# Patient Record
Sex: FEMALE
[De-identification: names, ages, dates, MRNs, and addresses within clinical notes are randomized per-mention and may not be internally consistent; named-entity substitution may affect disease eponyms.]

---

## 2020-04-26 ENCOUNTER — MESSAGE (OUTPATIENT)
Age: 45
End: 2020-04-26

## 2023-02-03 ENCOUNTER — EMERGENCY (EMERGENCY)
Facility: HOSPITAL | Age: 48
LOS: 1 days | Discharge: HOME | End: 2023-02-03
Attending: STUDENT IN AN ORGANIZED HEALTH CARE EDUCATION/TRAINING PROGRAM
Payer: MEDICAID

## 2023-02-03 VITALS
HEIGHT: 66 IN | OXYGEN SATURATION: 98 % | RESPIRATION RATE: 20 BRPM | WEIGHT: 149.91 LBS | TEMPERATURE: 99 F | HEART RATE: 81 BPM | DIASTOLIC BLOOD PRESSURE: 76 MMHG | SYSTOLIC BLOOD PRESSURE: 115 MMHG

## 2023-02-03 LAB
ALBUMIN SERPL ELPH-MCNC: 4.5 G/DL — SIGNIFICANT CHANGE UP (ref 3.3–5)
ALP SERPL-CCNC: 89 U/L — SIGNIFICANT CHANGE UP (ref 40–120)
ALT FLD-CCNC: 5 U/L — LOW (ref 10–45)
ANION GAP SERPL CALC-SCNC: 12 MMOL/L — SIGNIFICANT CHANGE UP (ref 5–17)
APTT BLD: 34.2 SEC — SIGNIFICANT CHANGE UP (ref 27.5–35.5)
AST SERPL-CCNC: 15 U/L — SIGNIFICANT CHANGE UP (ref 10–40)
BASOPHILS # BLD AUTO: 0.05 K/UL — SIGNIFICANT CHANGE UP (ref 0–0.2)
BASOPHILS NFR BLD AUTO: 1.1 % — SIGNIFICANT CHANGE UP (ref 0–2)
BILIRUB SERPL-MCNC: 0.2 MG/DL — SIGNIFICANT CHANGE UP (ref 0.2–1.2)
BUN SERPL-MCNC: 11 MG/DL — SIGNIFICANT CHANGE UP (ref 7–23)
CALCIUM SERPL-MCNC: 9.8 MG/DL — SIGNIFICANT CHANGE UP (ref 8.4–10.5)
CHLORIDE SERPL-SCNC: 102 MMOL/L — SIGNIFICANT CHANGE UP (ref 96–108)
CO2 SERPL-SCNC: 25 MMOL/L — SIGNIFICANT CHANGE UP (ref 22–31)
CREAT SERPL-MCNC: 0.99 MG/DL — SIGNIFICANT CHANGE UP (ref 0.5–1.3)
EGFR: 72 ML/MIN/1.73M2 — SIGNIFICANT CHANGE UP
EOSINOPHIL # BLD AUTO: 0.06 K/UL — SIGNIFICANT CHANGE UP (ref 0–0.5)
EOSINOPHIL NFR BLD AUTO: 1.3 % — SIGNIFICANT CHANGE UP (ref 0–6)
GLUCOSE SERPL-MCNC: 101 MG/DL — HIGH (ref 70–99)
HCT VFR BLD CALC: 39.4 % — SIGNIFICANT CHANGE UP (ref 34.5–45)
HGB BLD-MCNC: 12 G/DL — SIGNIFICANT CHANGE UP (ref 11.5–15.5)
IMM GRANULOCYTES NFR BLD AUTO: 0.2 % — SIGNIFICANT CHANGE UP (ref 0–0.9)
INR BLD: 1.11 RATIO — SIGNIFICANT CHANGE UP (ref 0.88–1.16)
LYMPHOCYTES # BLD AUTO: 1.1 K/UL — SIGNIFICANT CHANGE UP (ref 1–3.3)
LYMPHOCYTES # BLD AUTO: 23.2 % — SIGNIFICANT CHANGE UP (ref 13–44)
MCHC RBC-ENTMCNC: 24.5 PG — LOW (ref 27–34)
MCHC RBC-ENTMCNC: 30.5 GM/DL — LOW (ref 32–36)
MCV RBC AUTO: 80.4 FL — SIGNIFICANT CHANGE UP (ref 80–100)
MONOCYTES # BLD AUTO: 0.49 K/UL — SIGNIFICANT CHANGE UP (ref 0–0.9)
MONOCYTES NFR BLD AUTO: 10.3 % — SIGNIFICANT CHANGE UP (ref 2–14)
NEUTROPHILS # BLD AUTO: 3.03 K/UL — SIGNIFICANT CHANGE UP (ref 1.8–7.4)
NEUTROPHILS NFR BLD AUTO: 63.9 % — SIGNIFICANT CHANGE UP (ref 43–77)
NRBC # BLD: 0 /100 WBCS — SIGNIFICANT CHANGE UP (ref 0–0)
PLATELET # BLD AUTO: 303 K/UL — SIGNIFICANT CHANGE UP (ref 150–400)
POTASSIUM SERPL-MCNC: 3.4 MMOL/L — LOW (ref 3.5–5.3)
POTASSIUM SERPL-SCNC: 3.4 MMOL/L — LOW (ref 3.5–5.3)
PROT SERPL-MCNC: 8 G/DL — SIGNIFICANT CHANGE UP (ref 6–8.3)
PROTHROM AB SERPL-ACNC: 12.9 SEC — SIGNIFICANT CHANGE UP (ref 10.5–13.4)
RBC # BLD: 4.9 M/UL — SIGNIFICANT CHANGE UP (ref 3.8–5.2)
RBC # FLD: 15.9 % — HIGH (ref 10.3–14.5)
SARS-COV-2 RNA SPEC QL NAA+PROBE: SIGNIFICANT CHANGE UP
SODIUM SERPL-SCNC: 139 MMOL/L — SIGNIFICANT CHANGE UP (ref 135–145)
WBC # BLD: 4.74 K/UL — SIGNIFICANT CHANGE UP (ref 3.8–10.5)
WBC # FLD AUTO: 4.74 K/UL — SIGNIFICANT CHANGE UP (ref 3.8–10.5)

## 2023-02-03 PROCEDURE — 99223 1ST HOSP IP/OBS HIGH 75: CPT

## 2023-02-03 PROCEDURE — 99284 EMERGENCY DEPT VISIT MOD MDM: CPT | Mod: GC

## 2023-02-03 RX ORDER — POTASSIUM CHLORIDE 20 MEQ
40 PACKET (EA) ORAL ONCE
Refills: 0 | Status: COMPLETED | OUTPATIENT
Start: 2023-02-03 | End: 2023-02-03

## 2023-02-03 RX ADMIN — Medication 40 MILLIEQUIVALENT(S): at 12:46

## 2023-02-03 NOTE — ED PROVIDER NOTE - PHYSICAL EXAMINATION
Gen: NAD, AOx3, able to make needs known, non-toxic  Head: NCAT  HEENT: EOMI, normal conjunctiva  Lung: CTAB, no respiratory distress, no wheezes/rhonchi/rales B/L, speaking in full sentences  CV: RRR, no M/R/G, pulses bilaterally   Abd: soft, NTND, no guarding, no CVA tenderness  MSK: no visible bony deformities  Neuro: 5/5 strength in left arm and leg, 4/5 strength of the right extremities, CN 3-12 intact, mild right pronator shift, no slurred speech   Skin: Warm, well perfused, no rash  Psych: normal affect

## 2023-02-03 NOTE — ED ADULT NURSE NOTE - ED STAT RN HANDOFF DETAILS
Report given to receiving change of shift JENNIFER SERNA patient is in no acute distress. Patient vital signs stable, plan of care explained.

## 2023-02-03 NOTE — ED PROVIDER NOTE - CLINICAL SUMMARY MEDICAL DECISION MAKING FREE TEXT BOX
Franki, PGY2 -   47-year-old woman presenting with right-sided weakness that has been improving since she left [facility].  Ischemic infarct versus demyelinating disease considering her right lower extremity weakness is chronic in nature.  Left AMA from the hospital due to not receiving MRI in a timely fashion.  Will repeat labs, have neurology evaluate patient.  No clinical importance for repeat imaging at this time, MRI outpatient versus CDU.  We will follow-up with neurology recommendations. *The above represents an initial assessment/impression. Please refer to progress notes for potential changes in patient clinical course* Franki, PGY2 -   47-year-old woman presenting with right-sided weakness that has been improving since she left [facility].  Ischemic infarct versus demyelinating disease considering her right lower extremity weakness is chronic in nature.  Left AMA from the hospital due to not receiving MRI in a timely fashion.  Will repeat labs, have neurology evaluate patient.  No clinical importance for repeat imaging at this time, MRI outpatient versus CDU.  We will follow-up with neurology recommendations. *The above represents an initial assessment/impression. Please refer to progress notes for potential changes in patient clinical course*    pettet attending- see attending attestation for my mdm
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
## 2023-02-03 NOTE — ED PROVIDER NOTE - OBJECTIVE STATEMENT
47-year-old woman with no PMH, presenting after leaving [facility] for further management of right sided weakness.  Patient states that she has chronic right leg weakness for the last month.  On Sunday started having acute worsening of right leg weakness and new right arm weakness with right lip numbness.  Patient received a CT head and CTA which did not show any hemorrhage or large territorial infarcts, and received aspirin, Plavix, atorvastatin during her stay per the paperwork that was given to her by [facility].  Neurology team was considering ischemic stroke versus a demyelinating illness, however the MRI machine at [facility] was broken and patient thus decided to leave [place] to pursue MRI here. Patient states that since Sunday her weakness and numbness has improved.  Denies pain. 47-year-old woman with no PMH, presenting after leaving [facility] for further management of right sided weakness.  Patient states that she has chronic right leg weakness for the last month.  On Sunday started having acute worsening of right leg weakness and new right arm weakness with right lip numbness. Presented to [facility] on Monday, had a CT head and CTA which did not show any hemorrhage or large territorial infarcts, and received aspirin, Plavix, atorvastatin during her stay per the paperwork that was given to her by [facility].  Neurology team was considering ischemic stroke versus a demyelinating illness, however the MRI machine at [facility] was broken and patient thus decided to leave [place] to pursue MRI here. Patient states that since Sunday her weakness and numbness has improved.  Denies pain.

## 2023-02-03 NOTE — ED ADULT NURSE NOTE - NSIMPLEMENTINTERV_GEN_ALL_ED
Implemented All Universal Safety Interventions:  [place] to call system. Call bell, personal items and telephone within reach. Instruct patient to call for assistance. Room bathroom lighting operational. Non-slip footwear when patient is off stretcher. Physically safe environment: no spills, clutter or unnecessary equipment. Stretcher in lowest position, wheels locked, appropriate side rails in place.

## 2023-02-03 NOTE — CONSULT NOTE ADULT - ASSESSMENT
ASSESSMENT     IMPRESSION     RECOMMENDATION  Impression: CNS inflammatory process such as MS    Plan:       Patient to be seen by Neurology attending. Note finalized upon attending attestation.  ASSESSMENT   Patient ASTRID BLACK is a 47y (1975) woman with no significant PMHx presenting with a 1-2 week history of RLE weakness and a history of RUE weakness and numbness in the bottom lip since 1/29. Neurological exam notable for weakness in RUE/RLE, b    IMPRESSION     RECOMMENDATION  Impression: CNS inflammatory process such as MS    Plan:       Patient to be seen by Neurology attending. Note finalized upon attending attestation.  ASSESSMENT   Patient ASTRID BLACK is a 47y (1975) woman with no significant PMHx presenting with a 1-2 week history of RLE weakness and a history of RUE weakness and numbness in the bottom lip since 1/29. Neurological exam notable for weakness in RUE/RLE, decreased sensation in RUE/RLE, and weakness/decreased sensation in V2/V3 distrubution of the face.      IMPRESSION   Impression: CNS inflammatory process such as MS. Other DDx include lacunar stroke. Ischemic CVA is lower on differential given timeline of findings.     RECOMMENDATION  MRI head and C-spine w/wo contrast  Check Vitamin B6, B12, E, D, [place] virus, HSV, CRP, ESR, VZV, TSH, NMO, and MOG     Plan:       Patient to be seen by Neurology attending. Note finalized upon attending attestation.

## 2023-02-03 NOTE — ED PROVIDER NOTE - NS ED ROS FT
GENERAL: No fever, no chills  EYES: No change in vision  HEENT: No trouble swallowing or speaking  CARDIAC: No chest pain  PULMONARY: No cough, no SOB  GI: No abdominal pain, no nausea, no vomiting, no diarrhea, no constipation  : No changes in urination  SKIN: No rashes  NEURO: No headache, no numbness, +weakness   MSK: No joint pain  Otherwise as HPI or negative.

## 2023-02-03 NOTE — CONSULT NOTE ADULT - ATTENDING COMMENTS
HPI as per resident note, personally verified by me. Patient with 1-2 weeks of RLE weakness and RUE weakness/numbness and R lower lip/face numbness since 1/29. She felt symptoms started suddenly for each and may have slightly improved, mainly with numbness. She went to an outside hospital and had CT head there that was reportedly normal but MRI not functioning so she left. She also reports several months of dizziness with standing as well as occasional SOB, mainly with climbing stairs. No vision changes, hearing changes, or speech changes.    Neurologic exam as per resident note with additions as below:  AAO x3, speech fluent  CN's II-XII intact except for mild R facial weakness (patient says this is old and her baseline) and R V2/V3 dec that does NOT split the midline  Strength L side 5/5 all. RUE 4+/5. RLE 4-4+/5 all except for DF/PF 4+/5  Sens intact on L side and dec on R side  FtN intact b/l  DTR's - 2+ BR b/l, 1+ biceps/triceps b/l, 3+ KJ b/l, 1+ R AJ, 0+ L AJ, and downgoing b/l plantar response    COVID (-)    A/P:  R sided weakness  Skin sensation disturbance  Dizziness    - Etiology for symptoms is broad and includes inflammatory/CNS demyelinating, infectious, or toxic/metabolic. May also consider cerebrovascular but less likely given age, lack of risk factors, time course, and distribution of symptoms. Dizziness sounds more peripheral/cardiac but will also exclude CNS cause  - MRI brain and c-spine w/ and w/o to assess for above  - Check labs for additional causes with B12, folate, TSH, free T4, Vit D 25 OH, B6, ESR, CRP, ACE, Lyme, WNV, CPK, HIV, NMO, MOG, A1C, UA  - Orthostatic vital signs  - PT/OT as tolerated  - Continue to address above medical problems, as you are doing  - Will continue to follow patient with you

## 2023-02-03 NOTE — ED ADULT NURSE NOTE - CHIEF COMPLAINT QUOTE
s/sx started 1/29 Sunday rt sided numbness/per pt resolving now was admitted at [facility] dx stroke no ct/no mri given plavix [facility] last night and came today

## 2023-02-03 NOTE — CONSULT NOTE ADULT - SUBJECTIVE AND OBJECTIVE BOX
Neurology - Consult Note    -  Spectra: [phone] ([facility]), [phone] ([facility])  -    HPI: Patient ASTRID BLACK is a 47y (1975) wo/man with a PMHx significant for ***      Review of Systems:  INCOMPLETE   CONSTITUTIONAL: No fevers or chills  EYES AND ENT: No visual changes or no throat pain   NECK: No pain or stiffness  RESPIRATORY: No hemoptysis or shortness of breath  CARDIOVASCULAR: No chest pain or palpitations  GASTROINTESTINAL: No melena or hematochezia  GENITOURINARY: No dysuria or hematuria  NEUROLOGICAL: +As stated in HPI above  SKIN: No itching, burning, rashes, or lesions   All other review of systems is negative unless indicated above.    Allergies:      PMHx/PSHx/Family Hx: As above, otherwise see below   No pertinent past medical history        Social Hx:  No current use of tobacco, alcohol, or illicit drugs  Lives with ***    Medications:  MEDICATIONS  (STANDING):  potassium chloride    Tablet ER 40 milliEquivalent(s) Oral once    MEDICATIONS  (PRN):      Vitals:  T(C): 37 (02-03-23 @ 10:56), Max: 37.2 (02-03-23 @ 10:25)  HR: 75 (02-03-23 @ 10:56) (75 - 81)  BP: 134/80 (02-03-23 @ 10:56) (115/76 - 134/80)  RR: 18 (02-03-23 @ 10:56) (18 - 20)  SpO2: 99% (02-03-23 @ 10:56) (98% - 99%)    Physical Examination: INCOMPLETE  General - NAD, pleasant, cooperative   Cardiovascular - Peripheral pulses palpable, no edema  Neurologic Exam:  Mental status - Awake, Alert, Oriented to person, place, and time. Speech fluent, repetition and naming intact. Follows simple and complex commands. Attention/concentration, recent and remote memory (including registration and recall), and fund of knowledge intact    Cranial nerves:  CN II: Visual fields are full to confrontation. Fundoscopic exam is normal with sharp discs. Pupils are 4 mm and briskly reactive to light. Visual acuity is 20/20 bilaterally.  CN III, IV, VI: EOMI, no nystagmus, no ptosis  CN V: Facial sensation is intact to pinprick in all 3 divisions bilaterally.  CN VII: Face is symmetric with normal eye closure and smile.  CN VII: Hearing is normal to rubbing fingers  CN IX, X: Palate elevates symmetrically. Phonation is normal.  CN XI: Head turning and shoulder shrug are intact  CN XII: Tongue is midline with normal movements and no atrophy.    Motor - Normal bulk and tone throughout. No pronator drift of out-stretched arms.  Strength testing            Deltoid      Biceps      Triceps     Wrist Extension    Wrist Flexion     Interossei         R            5                 5               5                     5                              5                        5                 5  L             5                 5               5                     5                              5                        5                 5              Hip Flexion    Hip Extension    Knee Flexion    Knee Extension    Dorsiflexion    Plantar Flexion  R              5                           5                       5                           5                            5                          5  L              5                           5                        5                           5                            5                          5    Sensation - Light touch/temperature OR pain/vibration intact in fingers and toes     DTR's -             Biceps      Triceps     Brachioradialis      Patellar    Ankle    Toes/plantar response  R             2+             2+                  2+                       2+            2+                 Down  L              2+             2+                 2+                        2+           2+                 Down    Coordination - Rapid alternating movements and fine finger movements are intact. There is no dysmetria on finger-to-nose and heel-knee-shin. There are no abnormal or extraneous movements. Romberg?    Gait and station - Posture is normal. Gait is steady with normal steps, base, arm swing, and turning. Heel and toe walking are normal. Tandem gait is normal     Labs:                        12.0   4.74  )-----------( 303      ( 03 Feb 2023 11:11 )             39.4     02-03    139  |  102  |  11  ----------------------------<  101<H>  3.4<L>   |  25  |  0.99    Ca    9.8      03 Feb 2023 11:11    TPro  8.0  /  Alb  4.5  /  TBili  0.2  /  DBili  x   /  AST  15  /  ALT  5<L>  /  AlkPhos  89  02-03    CAPILLARY BLOOD GLUCOSE        LIVER FUNCTIONS - ( 03 Feb 2023 11:11 )  Alb: 4.5 g/dL / Pro: 8.0 g/dL / ALK PHOS: 89 U/L / ALT: 5 U/L / AST: 15 U/L / GGT: x             PT/INR - ( 03 Feb 2023 11:11 )   PT: 12.9 sec;   INR: 1.11 ratio         PTT - ( 03 Feb 2023 11:11 )  PTT:34.2 sec  CSF:                  Radiology:     Neurology - Consult Note    -  Spectra: [phone] ([facility]), [phone] ([facility])  -    HPI: Patient ASTRID BLACK is a 47y (1975) woman with no significant PMHx presenting with a 1-2 week history of RLE weakness and a history of RUE weakness and numbness in the bottom lip since 1/29. Pt reports no preceding events prior to the RLE weakness; it just appeared one day. She describes having difficulty walking and having to hop on the R side as well as balance issues on the right. She reports feeling more clumsy than usual. On 1/29, the weakness in the arm and the numbness in the bottom lip occurred simultaneously, and she had a headache across her forehead. On 1/30, she reports taking a warm shower and having profound weakness on her entire R side of her body and difficulty standing and had to get out of the shower to avoid falling. Today she reports weakness in the RUE and RLE and numbness in the bottom lip, mostly on the R side. She mentions the numbness in the bottom lip is improving. Pt denies any numbness, tingling, burning sensation, or pain in the UE/LEs b/l. Neurology was consulted to evaluate her symptoms and determine if she needs to go to CDU.     Of note, pt reports having a cold about 2 months ago that wouldn't go away. She also reports a 2-3 month history of nausea, lightheadedness, dizziness, and a slight spinning sensation when going up and down the stairs. This feeling subsides after sitting and they occur with warning. She denies any nausea, lightheadedness or dizziness occurring with changing positions. She also reports a long standing history of on and off lower back pain.       Review of Systems:    CONSTITUTIONAL: No fevers or chills  EYES AND ENT: No visual changes or no throat pain. Reports long standing history of light sensitivity for which she wears glasses, although inconsistently uses glasses  NECK: Neck stiffness but no pain  RESPIRATORY: No shortness of breath  CARDIOVASCULAR: No chest pain   GASTROINTESTINAL: Stomach pain  GENITOURINARY: Reports long history of having to rush to the restroom sometimes   NEUROLOGICAL: +As stated in HPI above  SKIN: No itching, rashes, or lesions   All other review of systems is negative unless indicated above.    Allergies:      PMHx/PSHx/Family Hx: As above, otherwise see below   No pertinent past medical history    Fibroids  Hernia surgery ~ 10 years ago  Mom - stroke in about her 60s, diabetes, HTN      Social Hx:  No current use of tobacco, alcohol, or illicit drugs  Lives with ***    Medications:  MEDICATIONS  (STANDING):  potassium chloride    Tablet ER 40 milliEquivalent(s) Oral once    MEDICATIONS  (PRN):      Vitals:  T(C): 37 (02-03-23 @ 10:56), Max: 37.2 (02-03-23 @ 10:25)  HR: 75 (02-03-23 @ 10:56) (75 - 81)  BP: 134/80 (02-03-23 @ 10:56) (115/76 - 134/80)  RR: 18 (02-03-23 @ 10:56) (18 - 20)  SpO2: 99% (02-03-23 @ 10:56) (98% - 99%)    Physical Examination:   General - NAD, pleasant, cooperative   Neurologic Exam:  Mental status - Awake, Alert, Oriented to person, place, and time. Speech fluent, repetition and naming intact. Follows simple and complex commands. Attention/concentration, recent and remote memory (including registration and recall), and fund of knowledge intact    Cranial nerves:  CN II: Visual fields are full to confrontation. Pupils are 4 mm and briskly reactive to light.   CN III, IV, VI: EOMI, no nystagmus, no ptosis  CN V: Decreased sensation in bottom lip and right side of mouth. Otherwise equal sensation b/l  CN VII: Face is symmetric with normal eye closure and smile. Slight decrease in raising L eyebrow   CN VII: Hearing is normal to rubbing fingers  CN IX, X: Palate elevates symmetrically. Phonation is normal.  CN XI: Head turning and shoulder shrug are intact  CN XII: Tongue is midline with normal movements and no atrophy.    Motor - Normal bulk and tone throughout. No pronator drift of out-stretched arms.  Strength testing            Deltoid      Biceps      Triceps     Wrist Extension    Wrist Flexion     Interossei         R            4                4              4                    4                             4                       5                 5  L             5                 5               5                     5                              5                        5                 5              Hip Flexion    Hip Extension    Knee Flexion    Knee Extension    Dorsiflexion    Plantar Flexion  R              4                          4                      4                          4                           4                         4  L              5                           5                        5                           5                            5                          5    Sensation - Light touch/temperature OR pain/vibration intact in fingers and toes     DTR's -             Biceps      Triceps     Brachioradialis      Patellar    Ankle    Toes/plantar response  R             2+             2+                  2+                       2+            2+                 Down  L              2+             2+                 2+                        2+           2+                 Down    Coordination - Rapid alternating movements are intact. There is no dysmetria on finger-to-nose and heel-knee-shin. There are no abnormal or extraneous movements. Romberg - leans forward slightly with eyes closed    Gait and station - Posture is normal. Gait is steady with normal steps, base, arm swing, and turning. Has to hop on R side after a few steps, balance decreases as she continues to walk.       Labs:                        12.0   4.74  )-----------( 303      ( 03 Feb 2023 11:11 )             39.4     02-03    139  |  102  |  11  ----------------------------<  101<H>  3.4<L>   |  25  |  0.99    Ca    9.8      03 Feb 2023 11:11    TPro  8.0  /  Alb  4.5  /  TBili  0.2  /  DBili  x   /  AST  15  /  ALT  5<L>  /  AlkPhos  89  02-03    CAPILLARY BLOOD GLUCOSE        LIVER FUNCTIONS - ( 03 Feb 2023 11:11 )  Alb: 4.5 g/dL / Pro: 8.0 g/dL / ALK PHOS: 89 U/L / ALT: 5 U/L / AST: 15 U/L / GGT: x             PT/INR - ( 03 Feb 2023 11:11 )   PT: 12.9 sec;   INR: 1.11 ratio         PTT - ( 03 Feb 2023 11:11 )  PTT:34.2 sec  CSF:                  Radiology:     Neurology - Consult Note    -  Spectra: [phone] ([facility]), [phone] ([facility])  -    HPI: Patient ASTRID BLACK is a 47y (1975) woman with no significant PMHx presenting with a 1-2 week history of RLE weakness and a history of RUE weakness and numbness in the bottom lip since 1/29. Pt reports no preceding events prior to the RLE weakness; it just appeared one day. She describes having difficulty walking and having to hop on the R side as well as balance issues on the right. She reports feeling more clumsy than usual. On 1/29, the weakness in the arm and the numbness in the bottom lip occurred simultaneously, and she had a headache across her forehead. On 1/30, she reports taking a warm shower and having profound weakness on her entire R side of her body and difficulty standing and had to get out of the shower to avoid falling. Today she reports weakness in the RUE and RLE and numbness in the bottom lip, mostly on the R side. She mentions the numbness in the bottom lip is improving. Pt denies any numbness, tingling, burning sensation, or pain in the UE/LEs b/l. Neurology was consulted to evaluate her symptoms and determine if she needs to go to CDU.     Of note, pt reports having a cold about 2 months ago that wouldn't go away. She also reports a 2-3 month history of nausea, lightheadedness, dizziness, and a slight spinning sensation when going up and down the stairs. This feeling subsides after sitting and they occur with warning. She denies any nausea, lightheadedness or dizziness occurring with changing positions. She also reports a long standing history of on and off lower back pain.       Review of Systems:    CONSTITUTIONAL: No fevers or chills  EYES AND ENT: No visual changes or no throat pain. Reports long standing history of light sensitivity for which she wears glasses, although inconsistently uses glasses  NECK: Neck stiffness but no pain  RESPIRATORY: No shortness of breath  CARDIOVASCULAR: No chest pain   GASTROINTESTINAL: Stomach pain  GENITOURINARY: Reports long history of having to rush to the restroom sometimes   NEUROLOGICAL: +As stated in HPI above  SKIN: No itching, rashes, or lesions   All other review of systems is negative unless indicated above.    Allergies:      PMHx/PSHx/Family Hx: As above, otherwise see below   No pertinent past medical history    Fibroids  Hernia surgery ~ 10 years ago  Mom - stroke in about her 60s, diabetes, HTN      Social Hx:  No current use of tobacco, alcohol, or illicit drugs  Lives with ***    Medications:  MEDICATIONS  (STANDING):  potassium chloride    Tablet ER 40 milliEquivalent(s) Oral once    MEDICATIONS  (PRN):      Vitals:  T(C): 37 (02-03-23 @ 10:56), Max: 37.2 (02-03-23 @ 10:25)  HR: 75 (02-03-23 @ 10:56) (75 - 81)  BP: 134/80 (02-03-23 @ 10:56) (115/76 - 134/80)  RR: 18 (02-03-23 @ 10:56) (18 - 20)  SpO2: 99% (02-03-23 @ 10:56) (98% - 99%)    Physical Examination:   General - NAD, pleasant, cooperative   Neurologic Exam:  Mental status - Awake, Alert, Oriented to person, place, and time. Speech fluent, repetition and naming intact. Follows simple and complex commands. Attention/concentration, recent and remote memory (including registration and recall), and fund of knowledge intact    Cranial nerves:  CN II: Visual fields are full to confrontation. Pupils are 4 mm and briskly reactive to light.   CN III, IV, VI: EOMI, no nystagmus, no ptosis  CN V: Decreased sensation in bottom lip and right side of mouth. Otherwise equal sensation b/l  CN VII: Face is symmetric with normal eye closure and smile. Slight decrease in raising L eyebrow   CN VII: Hearing is normal to rubbing fingers  CN IX, X: Palate elevates symmetrically. Phonation is normal.  CN XI: Head turning and shoulder shrug are intact  CN XII: Tongue is midline with normal movements and no atrophy.    Motor - Normal bulk and tone throughout. No pronator drift of out-stretched arms.  Strength testing            Deltoid      Biceps      Triceps     Wrist Extension    Wrist Flexion     Interossei         R            4                4              4                    4                             4                       5                 5  L             5                 5               5                     5                              5                        5                 5              Hip Flexion    Hip Extension    Knee Flexion    Knee Extension    Dorsiflexion    Plantar Flexion  R              4                          4                      4                          4                           4                         4  L              5                           5                        5                           5                            5                          5    Sensation - Light touch intact in fingers and toes     DTR's -             Biceps      Triceps     Brachioradialis      Patellar    Ankle    Toes/plantar response  R             2+             2+                  2+                       2+            2+                 Down  L              2+             2+                 2+                        2+           2+                 Down    Coordination - Rapid alternating movements are intact. There is no dysmetria on finger-to-nose and heel-knee-shin. There are no abnormal or extraneous movements. Romberg - leans forward slightly with eyes closed    Gait and station - Posture is normal. Gait is steady with normal steps, base, arm swing, and turning. Has to hop on R side after a few steps, balance decreases as she continues to walk.       Labs:                        12.0   4.74  )-----------( 303      ( 03 Feb 2023 11:11 )             39.4     02-03    139  |  102  |  11  ----------------------------<  101<H>  3.4<L>   |  25  |  0.99    Ca    9.8      03 Feb 2023 11:11    TPro  8.0  /  Alb  4.5  /  TBili  0.2  /  DBili  x   /  AST  15  /  ALT  5<L>  /  AlkPhos  89  02-03    CAPILLARY BLOOD GLUCOSE        LIVER FUNCTIONS - ( 03 Feb 2023 11:11 )  Alb: 4.5 g/dL / Pro: 8.0 g/dL / ALK PHOS: 89 U/L / ALT: 5 U/L / AST: 15 U/L / GGT: x             PT/INR - ( 03 Feb 2023 11:11 )   PT: 12.9 sec;   INR: 1.11 ratio         PTT - ( 03 Feb 2023 11:11 )  PTT:34.2 sec  CSF:                  Radiology:     Neurology - Consult Note    -  Spectra: [phone] ([facility]), [phone] ([facility])  -    HPI: Patient ASTRID BLACK is a 47y (1975) woman with no significant PMHx presenting with a 1-2 week history of RLE weakness and a history of RUE weakness and numbness in the bottom lip since 1/29. Pt reports no preceding events prior to the RLE weakness; it just appeared one day. She describes having difficulty walking and having to hop on the L side as well as balance issues on the right. She reports feeling more clumsy than usual. On 1/29, the weakness in the arm and the numbness in the bottom lip occurred simultaneously, and she had a headache across her forehead. On 1/30, she reports taking a warm shower and having profound weakness on her entire R side of her body and difficulty standing and had to get out of the shower to avoid falling. Today she reports weakness in the RUE and RLE and numbness in the bottom lip, mostly on the R side. She mentions the numbness in the bottom lip is improving. Pt denies any numbness, tingling, burning sensation, or pain in the UE/LEs b/l. Neurology was consulted to evaluate her symptoms and determine if she needs to go to CDU.     Of note, pt reports having a cold about 2 months ago that wouldn't go away. She also reports a 2-3 month history of nausea, lightheadedness, dizziness, and a slight spinning sensation when going up and down the stairs. This feeling subsides after sitting and they occur with warning. She denies any nausea, lightheadedness or dizziness occurring with changing positions. She also reports a long standing history of on and off lower back pain.       Review of Systems:    CONSTITUTIONAL: No fevers or chills  EYES AND ENT: No visual changes or no throat pain. Reports long standing history of light sensitivity for which she wears glasses, although inconsistently uses glasses  NECK: Neck stiffness but no pain  RESPIRATORY: No shortness of breath  CARDIOVASCULAR: No chest pain   GASTROINTESTINAL: Stomach pain  GENITOURINARY: Reports long history of having to rush to the restroom sometimes   NEUROLOGICAL: +As stated in HPI above  SKIN: No itching, rashes, or lesions   All other review of systems is negative unless indicated above.    Allergies:      PMHx/PSHx/Family Hx: As above, otherwise see below   No pertinent past medical history    Fibroids  Hernia surgery ~ 10 years ago  Mom - stroke in about her 60s, diabetes, HTN      Social Hx:  No current use of tobacco, alcohol, or illicit drugs  Lives with ***    Medications:  MEDICATIONS  (STANDING):  potassium chloride    Tablet ER 40 milliEquivalent(s) Oral once    MEDICATIONS  (PRN):      Vitals:  T(C): 37 (02-03-23 @ 10:56), Max: 37.2 (02-03-23 @ 10:25)  HR: 75 (02-03-23 @ 10:56) (75 - 81)  BP: 134/80 (02-03-23 @ 10:56) (115/76 - 134/80)  RR: 18 (02-03-23 @ 10:56) (18 - 20)  SpO2: 99% (02-03-23 @ 10:56) (98% - 99%)    Physical Examination:   General - NAD, pleasant, cooperative   Neurologic Exam:  Mental status - Awake, Alert, Oriented to person, place, and time. Speech fluent, repetition and naming intact. Follows simple and complex commands. Attention/concentration, recent and remote memory (including registration and recall), and fund of knowledge intact    Cranial nerves:  CN II: Visual fields are full to confrontation. Pupils are 4 mm and briskly reactive to light.   CN III, IV, VI: EOMI, no nystagmus, no ptosis  CN V: Decreased sensation in bottom lip and right side of mouth. Otherwise equal sensation b/l  CN VII: Face is symmetric with normal eye closure and smile. Slight decrease in raising L eyebrow   CN VII: Hearing is normal to rubbing fingers  CN IX, X: Palate elevates symmetrically. Phonation is normal.  CN XI: Head turning and shoulder shrug are intact  CN XII: Tongue is midline with normal movements and no atrophy.    Motor - Normal bulk and tone throughout. No pronator drift of out-stretched arms.  Strength testing            Deltoid      Biceps      Triceps     Wrist Extension    Wrist Flexion     Interossei         R            4                4              4                    4                             4                       5                 5  L             5                 5               5                     5                              5                        5                 5              Hip Flexion    Hip Extension    Knee Flexion    Knee Extension    Dorsiflexion    Plantar Flexion  R              4                          4                      4                          4                           4                         4  L              5                           5                        5                           5                            5                          5    Sensation - Light touch intact in fingers and toes     DTR's -             Biceps      Triceps     Brachioradialis      Patellar    Ankle    Toes/plantar response  R             2+             2+                  2+                       2+            2+                 Down  L              2+             2+                 2+                        2+           2+                 Down    Coordination - Rapid alternating movements are intact. There is no dysmetria on finger-to-nose and heel-knee-shin. There are no abnormal or extraneous movements. Romberg - leans forward slightly with eyes closed    Gait and station - Posture is normal. Gait is steady with normal steps, base, arm swing, and turning. Has to hop on L side after a few steps, balance decreases as she continues to walk.       Labs:                        12.0   4.74  )-----------( 303      ( 03 Feb 2023 11:11 )             39.4     02-03    139  |  102  |  11  ----------------------------<  101<H>  3.4<L>   |  25  |  0.99    Ca    9.8      03 Feb 2023 11:11    TPro  8.0  /  Alb  4.5  /  TBili  0.2  /  DBili  x   /  AST  15  /  ALT  5<L>  /  AlkPhos  89  02-03    CAPILLARY BLOOD GLUCOSE        LIVER FUNCTIONS - ( 03 Feb 2023 11:11 )  Alb: 4.5 g/dL / Pro: 8.0 g/dL / ALK PHOS: 89 U/L / ALT: 5 U/L / AST: 15 U/L / GGT: x             PT/INR - ( 03 Feb 2023 11:11 )   PT: 12.9 sec;   INR: 1.11 ratio         PTT - ( 03 Feb 2023 11:11 )  PTT:34.2 sec  CSF:                  Radiology:     Neurology - Consult Note    -  Spectra: [phone] ([facility]), [phone] ([facility])  -    HPI: Patient ASTRID BLACK is a 47y (1975) woman with no significant PMHx presenting with a 1-2 week history of RLE weakness and a history of RUE weakness and numbness in the bottom lip since 1/29. Pt reports no preceding events prior to the RLE weakness; it just appeared one day. She describes having difficulty walking and having to hop on the L side as well as balance issues on the right. She reports feeling more clumsy than usual. On 1/29, the weakness in the arm and the numbness in the bottom lip occurred simultaneously, and she had a headache across her forehead. On 1/30, she reports taking a warm shower and having profound weakness on her entire R side of her body and difficulty standing and had to get out of the shower to avoid falling. Today she reports weakness in the RUE and RLE and numbness in the bottom lip, mostly on the R side. She mentions the numbness in the bottom lip is improving. Pt denies any numbness, tingling, burning sensation, or pain in the UE/LEs b/l. Neurology was consulted to evaluate her symptoms and determine if she needs to go to CDU.     Of note, pt reports having a cold about 2 months ago that wouldn't go away. She also reports a 2-3 month history of nausea, lightheadedness, dizziness, and a slight spinning sensation when going up and down the stairs. This feeling subsides after sitting and they occur with warning. She denies any nausea, lightheadedness or dizziness occurring with changing positions. She also reports a long standing history of on and off lower back pain.       Review of Systems:    CONSTITUTIONAL: No fevers or chills  EYES AND ENT: No visual changes or no throat pain. Reports long standing history of light sensitivity for which she wears glasses, although inconsistently uses glasses  NECK: Neck stiffness but no pain  RESPIRATORY: No shortness of breath  CARDIOVASCULAR: No chest pain   GASTROINTESTINAL: Stomach pain  GENITOURINARY: Reports long history of having to rush to the restroom sometimes   NEUROLOGICAL: +As stated in HPI above  SKIN: No itching, rashes, or lesions   All other review of systems is negative unless indicated above.    Allergies:      PMHx/PSHx/Family Hx: As above, otherwise see below   No pertinent past medical history    Fibroids  Hernia surgery ~ 10 years ago  Mom - stroke in about her 60s, diabetes, HTN      Social Hx:  No current use of tobacco, alcohol, or illicit drugs  Lives with ***    Medications:  MEDICATIONS  (STANDING):  potassium chloride    Tablet ER 40 milliEquivalent(s) Oral once    MEDICATIONS  (PRN):      Vitals:  T(C): 37 (02-03-23 @ 10:56), Max: 37.2 (02-03-23 @ 10:25)  HR: 75 (02-03-23 @ 10:56) (75 - 81)  BP: 134/80 (02-03-23 @ 10:56) (115/76 - 134/80)  RR: 18 (02-03-23 @ 10:56) (18 - 20)  SpO2: 99% (02-03-23 @ 10:56) (98% - 99%)    Physical Examination:   General - NAD, pleasant, cooperative   Neurologic Exam:  Mental status - Awake, Alert, Oriented to person, place, and time. Speech fluent, repetition and naming intact. Follows simple and complex commands. Attention/concentration, recent and remote memory (including registration and recall), and fund of knowledge intact    Cranial nerves:  CN II: Visual fields are full to confrontation. Pupils are 4 mm and briskly reactive to light.   CN III, IV, VI: EOMI, no nystagmus, no ptosis  CN V: Decreased sensation in bottom lip and right side of mouth. Otherwise equal sensation b/l  CN VII: Face is symmetric with normal eye closure and smile. Slight decrease in raising L eyebrow   CN VII: Hearing is normal to rubbing fingers  CN IX, X: Palate elevates symmetrically. Phonation is normal.  CN XI: Head turning and shoulder shrug are intact  CN XII: Tongue is midline with normal movements and no atrophy.    Motor - Normal bulk and tone throughout. No pronator drift of out-stretched arms.  Strength testing            Deltoid      Biceps      Triceps     Wrist Extension    Wrist Flexion     Interossei         R            4                4              4                    4                             4                       5                 5  L             5                 5               5                     5                              5                        5                 5              Hip Flexion    Hip Extension    Knee Flexion    Knee Extension    Dorsiflexion    Plantar Flexion  R              4                          4                      4                          4                           4                         4  L              5                           5                        5                           5                            5                          5    Sensation - Light touch intact in fingers and toes     DTR's -             Biceps      Triceps     Brachioradialis      Patellar    Ankle    Toes/plantar response  R             2+             2+                  2+                       1+            0+                 Down  L              2+             2+                 2+                        2+           2+                 Down    Coordination - Rapid alternating movements are intact. There is no dysmetria on finger-to-nose and heel-knee-shin. There are no abnormal or extraneous movements. Romberg - leans forward slightly with eyes closed    Gait and station - Posture is normal. Gait is steady with normal steps, base, arm swing, and turning. Has to hop on L side after a few steps, balance decreases as she continues to walk.       Labs:                        12.0   4.74  )-----------( 303      ( 03 Feb 2023 11:11 )             39.4     02-03    139  |  102  |  11  ----------------------------<  101<H>  3.4<L>   |  25  |  0.99    Ca    9.8      03 Feb 2023 11:11    TPro  8.0  /  Alb  4.5  /  TBili  0.2  /  DBili  x   /  AST  15  /  ALT  5<L>  /  AlkPhos  89  02-03    CAPILLARY BLOOD GLUCOSE        LIVER FUNCTIONS - ( 03 Feb 2023 11:11 )  Alb: 4.5 g/dL / Pro: 8.0 g/dL / ALK PHOS: 89 U/L / ALT: 5 U/L / AST: 15 U/L / GGT: x             PT/INR - ( 03 Feb 2023 11:11 )   PT: 12.9 sec;   INR: 1.11 ratio         PTT - ( 03 Feb 2023 11:11 )  PTT:34.2 sec  CSF:                  Radiology:     Neurology - Consult Note    -  Spectra: [id number] ([facility]), [phone] ([facility])  -    HPI: Patient ASTRID BLACK is a 47y (1975) woman with no significant PMHx presenting with a 1-2 week history of RLE weakness and a history of RUE weakness and numbness in the bottom lip since 1/29. Pt reports no preceding events prior to the RLE weakness; it just appeared one day. She describes having difficulty walking and having to hop on the L side as well as balance issues on the right. She reports feeling more clumsy than usual. On 1/29, the weakness in the arm and the numbness in the bottom lip occurred simultaneously, and she had a headache across her forehead. On 1/30, she reports taking a warm shower and having profound weakness on her entire R side of her body and difficulty standing and had to get out of the shower to avoid falling. Today she reports weakness in the RUE and RLE and numbness in the bottom lip, mostly on the R side. She mentions the numbness in the bottom lip is improving. Pt denies any numbness, tingling, burning sensation, or pain in the UE/LEs b/l. Neurology was consulted to evaluate her symptoms and determine if she needs to go to CDU.     Of note, pt reports having a cold about 2 months ago that wouldn't go away. She also reports a 2-3 month history of nausea, lightheadedness, dizziness, and a slight spinning sensation when going up and down the stairs. This feeling subsides after sitting and they occur with warning. She denies any nausea, lightheadedness or dizziness occurring with changing positions. She also reports a long standing history of on and off lower back pain.       Review of Systems:    CONSTITUTIONAL: No fevers or chills  EYES AND ENT: No visual changes or no throat pain. Reports long standing history of light sensitivity for which she wears glasses, although inconsistently uses glasses  NECK: Neck stiffness but no pain  RESPIRATORY: No shortness of breath  CARDIOVASCULAR: No chest pain   GASTROINTESTINAL: Stomach pain  GENITOURINARY: Reports long history of having to rush to the restroom sometimes   NEUROLOGICAL: +As stated in HPI above  SKIN: No itching, rashes, or lesions   All other review of systems is negative unless indicated above.    Allergies:  NKDA    PMHx/PSHx/Family Hx: As above, otherwise see below   No pertinent past medical history    Fibroids  Hernia surgery ~ 10 years ago  Mom - stroke in about her 60s, diabetes, HTN      Social Hx:  No current use of tobacco, alcohol, or illicit drugs    Medications:  MEDICATIONS  (STANDING):  potassium chloride    Tablet ER 40 milliEquivalent(s) Oral once    MEDICATIONS  (PRN):      Vitals:  T(C): 37 (02-03-23 @ 10:56), Max: 37.2 (02-03-23 @ 10:25)  HR: 75 (02-03-23 @ 10:56) (75 - 81)  BP: 134/80 (02-03-23 @ 10:56) (115/76 - 134/80)  RR: 18 (02-03-23 @ 10:56) (18 - 20)  SpO2: 99% (02-03-23 @ 10:56) (98% - 99%)    Physical Examination:   General - NAD, pleasant, cooperative   CV - peripheral pulses palpable, no edema  Eyes - Fundoscopy not performed due to safety precautions in setting of COVID-19 pandemic    Neurologic Exam:  Mental status - Awake, Alert, Oriented to person, place, and time. Speech fluent, repetition and naming intact. Follows simple and complex commands. Attention/concentration, recent and remote memory (including registration and recall), and fund of knowledge intact    Cranial nerves:  CN II: Visual fields are full to confrontation. Pupils are 4 mm and briskly reactive to light.   CN III, IV, VI: EOMI, no nystagmus, no ptosis  CN V: Decreased sensation in bottom lip and right side of mouth. Otherwise equal sensation b/l  CN VII: Face is symmetric with normal eye closure and smile. Slight decrease in raising L eyebrow   CN VII: Hearing is normal to rubbing fingers  CN IX, X: Palate elevates symmetrically. Phonation is normal.  CN XI: Head turning and shoulder shrug are intact  CN XII: Tongue is midline with normal movements and no atrophy.    Motor - Normal bulk and tone throughout. No pronator drift of out-stretched arms.  Strength testing            Deltoid      Biceps      Triceps     Wrist Extension    Wrist Flexion     Interossei         R            4                4              4                    4                             4                       5                 5  L             5                 5               5                     5                              5                        5                 5              Hip Flexion    Hip Extension    Knee Flexion    Knee Extension    Dorsiflexion    Plantar Flexion  R              4                          4                      4                          4                           4                         4  L              5                           5                        5                           5                            5                          5    Sensation - Light touch intact in fingers and toes     DTR's -             Biceps      Triceps     Brachioradialis      Patellar    Ankle    Toes/plantar response  R             2+             2+                  2+                       1+            0+                 Down  L              2+             2+                 2+                        2+           2+                 Down    Coordination - Rapid alternating movements are intact. There is no dysmetria on finger-to-nose and heel-knee-shin. There are no abnormal or extraneous movements.    Gait and station - Posture is normal. Gait is steady with normal steps, base, arm swing, and turning. Has to hop on L side after a few steps, balance decreases as she continues to walk. Romberg - leans forward slightly with eyes closed      Labs:                        12.0   4.74  )-----------( 303      ( 03 Feb 2023 11:11 )             39.4     02-03    139  |  102  |  11  ----------------------------<  101<H>  3.4<L>   |  25  |  0.99    Ca    9.8      03 Feb 2023 11:11    TPro  8.0  /  Alb  4.5  /  TBili  0.2  /  DBili  x   /  AST  15  /  ALT  5<L>  /  AlkPhos  89  02-03    CAPILLARY BLOOD GLUCOSE        LIVER FUNCTIONS - ( 03 Feb 2023 11:11 )  Alb: 4.5 g/dL / Pro: 8.0 g/dL / ALK PHOS: 89 U/L / ALT: 5 U/L / AST: 15 U/L / GGT: x             PT/INR - ( 03 Feb 2023 11:11 )   PT: 12.9 sec;   INR: 1.11 ratio         PTT - ( 03 Feb 2023 11:11 )  PTT:34.2 sec  CSF:                  Radiology:

## 2023-02-03 NOTE — ED PROVIDER NOTE - PROGRESS NOTE DETAILS
Franki, PGY2 - discussed with neuro, will come see patient. ashlyn attending- neuro recc cdu for mri brain cspine w and wo cont eval for demyelinating dz, discussed with patient agreed w plan discussed with cdu pa will eval patient

## 2023-02-03 NOTE — ED ADULT NURSE NOTE - OBJECTIVE STATEMENT
46yo F aaox4 denies PMHx, presents ambulatory from home, as per 1/29/23 had symptoms: R body side weakness and lower lip numbness, pt went to [facility] 1/30/23 , ct scan was done: stroke +, pt was admitted , awaiting for MRI, but pt AMA yesterday because the MRI machine was broke, on examination:  R side weakness, R lip drooping, Pt denies CP, SOB, HA, vision changes, n/v/d, fevers chills, abdominal pain, Safety and comfort measures initiated- bed placed in lowest position and side rails raised. Pt oriented to call bell system.

## 2023-02-03 NOTE — ED PROVIDER NOTE - ATTENDING CONTRIBUTION TO CARE
I, Tello Horton, performed a history and physical exam of the patient and discussed their management with the resident and/or advanced care provider. I reviewed the resident and/or advanced care provider's note and agree with the documented findings and plan of care. I was present and available for all procedures.    47-year-old woman with no PMH, presenting after leaving [facility] for further management of right sided weakness.  Patient states that she has chronic right leg weakness for the last month.  On Sunday started having acute worsening of right leg weakness and new right arm weakness with right lip numbness. Presented to [facility] on Monday, had a CT head and CTA which did not show any hemorrhage or large territorial infarcts, and received aspirin, Plavix, atorvastatin during her stay per the paperwork that was given to her by [facility].  Neurology team was considering ischemic stroke versus a demyelinating illness, however the MRI machine at [facility] was broken and patient thus decided to leave [place] to pursue MRI here. Patient states that since Sunday her weakness and numbness has improved.  Denies pain.    Well appearing and in NAD, head normal appearing atraumatic, trachea midline, no respiratory distress, lungs cta bilaterally, rrr no murmurs, soft NT ND abdomen, no visible extremity deformities, Alert and oriented, non focal neuro exam, skin warm and dry, normal affect and mood no facial asymmetry, no drift no diadochokinesia amb wo assistance     poss cva reported wnl cth ctah/n otherwise neuro intact reported weakness will eval with screening labs ekg neuro consultation for further guidance on disposition. discussed with patient agreed w plan unlikely acs pe ptx pna dissection no hx to suggest Demyelinating dz

## 2023-02-03 NOTE — ED ADULT TRIAGE NOTE - CHIEF COMPLAINT QUOTE
s/sx started 1/29 Sunday rt sided numbness/per pt resolving now was admitted at [facility] dx stroke no ct/no mri given plavix [facility] last night and came today

## 2023-02-04 VITALS
TEMPERATURE: 99 F | HEART RATE: 70 BPM | RESPIRATION RATE: 17 BRPM | OXYGEN SATURATION: 100 % | DIASTOLIC BLOOD PRESSURE: 96 MMHG | SYSTOLIC BLOOD PRESSURE: 127 MMHG

## 2023-02-04 LAB
24R-OH-CALCIDIOL SERPL-MCNC: 21.1 NG/ML — LOW (ref 30–80)
A1C WITH ESTIMATED AVERAGE GLUCOSE RESULT: 6.1 % — HIGH (ref 4–5.6)
B BURGDOR C6 AB SER-ACNC: NEGATIVE — SIGNIFICANT CHANGE UP
B BURGDOR IGG+IGM SER-ACNC: 0.66 INDEX — SIGNIFICANT CHANGE UP (ref 0.01–0.89)
CHOLEST SERPL-MCNC: 182 MG/DL — SIGNIFICANT CHANGE UP
CK SERPL-CCNC: 181 U/L — HIGH (ref 25–170)
CRP SERPL-MCNC: 7 MG/L — HIGH (ref 0–4)
ERYTHROCYTE [SEDIMENTATION RATE] IN BLOOD: 54 MM/HR — HIGH (ref 0–15)
ESTIMATED AVERAGE GLUCOSE: 128 MG/DL — HIGH (ref 68–114)
FOLATE SERPL-MCNC: 14.9 NG/ML — SIGNIFICANT CHANGE UP
HCG SERPL-ACNC: <2 MIU/ML — SIGNIFICANT CHANGE UP
HDLC SERPL-MCNC: 58 MG/DL — SIGNIFICANT CHANGE UP
HIV 1+2 AB+HIV1 P24 AG SERPL QL IA: SIGNIFICANT CHANGE UP
LIPID PNL WITH DIRECT LDL SERPL: 106 MG/DL — HIGH
NON HDL CHOLESTEROL: 124 MG/DL — SIGNIFICANT CHANGE UP
T4 FREE SERPL-MCNC: 1 NG/DL — SIGNIFICANT CHANGE UP (ref 0.9–1.8)
TRIGL SERPL-MCNC: 86 MG/DL — SIGNIFICANT CHANGE UP
TSH SERPL-MCNC: 3.75 UIU/ML — SIGNIFICANT CHANGE UP (ref 0.27–4.2)
VIT B12 SERPL-MCNC: 740 PG/ML — SIGNIFICANT CHANGE UP (ref 232–1245)

## 2023-02-04 PROCEDURE — 85652 RBC SED RATE AUTOMATED: CPT

## 2023-02-04 PROCEDURE — 82306 VITAMIN D 25 HYDROXY: CPT

## 2023-02-04 PROCEDURE — G1004: CPT

## 2023-02-04 PROCEDURE — 83036 HEMOGLOBIN GLYCOSYLATED A1C: CPT

## 2023-02-04 PROCEDURE — 72156 MRI NECK SPINE W/O & W/DYE: CPT | Mod: MG

## 2023-02-04 PROCEDURE — 97161 PT EVAL LOW COMPLEX 20 MIN: CPT

## 2023-02-04 PROCEDURE — 99238 HOSP IP/OBS DSCHRG MGMT 30/<: CPT

## 2023-02-04 PROCEDURE — 93005 ELECTROCARDIOGRAM TRACING: CPT

## 2023-02-04 PROCEDURE — 99284 EMERGENCY DEPT VISIT MOD MDM: CPT

## 2023-02-04 PROCEDURE — 85610 PROTHROMBIN TIME: CPT

## 2023-02-04 PROCEDURE — 80061 LIPID PANEL: CPT

## 2023-02-04 PROCEDURE — 84207 ASSAY OF VITAMIN B-6: CPT

## 2023-02-04 PROCEDURE — 82550 ASSAY OF CK (CPK): CPT

## 2023-02-04 PROCEDURE — 84443 ASSAY THYROID STIM HORMONE: CPT

## 2023-02-04 PROCEDURE — 84439 ASSAY OF FREE THYROXINE: CPT

## 2023-02-04 PROCEDURE — 86618 LYME DISEASE ANTIBODY: CPT

## 2023-02-04 PROCEDURE — 70553 MRI BRAIN STEM W/O & W/DYE: CPT | Mod: 26,MG

## 2023-02-04 PROCEDURE — G0378: CPT

## 2023-02-04 PROCEDURE — 86362 MOG-IGG1 ANTB CBA EACH: CPT

## 2023-02-04 PROCEDURE — 84702 CHORIONIC GONADOTROPIN TEST: CPT

## 2023-02-04 PROCEDURE — 86053 AQAPRN-4 ANTB FLO CYTMTRY EA: CPT

## 2023-02-04 PROCEDURE — 70553 MRI BRAIN STEM W/O & W/DYE: CPT | Mod: MG

## 2023-02-04 PROCEDURE — U0003: CPT

## 2023-02-04 PROCEDURE — 36415 COLL VENOUS BLD VENIPUNCTURE: CPT

## 2023-02-04 PROCEDURE — 86789 WEST NILE VIRUS ANTIBODY: CPT

## 2023-02-04 PROCEDURE — 86140 C-REACTIVE PROTEIN: CPT

## 2023-02-04 PROCEDURE — 87476 LYME DIS DNA AMP PROBE: CPT

## 2023-02-04 PROCEDURE — 86788 WEST NILE VIRUS AB IGM: CPT

## 2023-02-04 PROCEDURE — 82607 VITAMIN B-12: CPT

## 2023-02-04 PROCEDURE — U0005: CPT

## 2023-02-04 PROCEDURE — 80053 COMPREHEN METABOLIC PANEL: CPT

## 2023-02-04 PROCEDURE — 87389 HIV-1 AG W/HIV-1&-2 AB AG IA: CPT

## 2023-02-04 PROCEDURE — 85730 THROMBOPLASTIN TIME PARTIAL: CPT

## 2023-02-04 PROCEDURE — 72156 MRI NECK SPINE W/O & W/DYE: CPT | Mod: 26,MG

## 2023-02-04 PROCEDURE — 85025 COMPLETE CBC W/AUTO DIFF WBC: CPT

## 2023-02-04 PROCEDURE — 82746 ASSAY OF FOLIC ACID SERUM: CPT

## 2023-02-04 RX ADMIN — Medication 1 MILLIGRAM(S): at 08:29

## 2023-02-04 NOTE — ED CDU PROVIDER SUBSEQUENT DAY NOTE - PHYSICAL EXAMINATION
Gen: NAD, AOx3, able to make needs known, non-toxic  		Head: NCAT, reported decreased sensation R face compared to L side   		HEENT: EOMI, normal conjunctiva  		Lung: CTAB, no respiratory distress, no wheezes/rhonchi/rales B/L, speaking in full sentences  		CV: RRR, no M/R/G  		Abd: soft, NTND, no guarding  		MSK: no visible bony deformities  		Neuro: 5/5 strength LUE/LLE, 4/5 strength RUE/RLE, reports decreased sensation RUE/RLE compared to LUE/LLE, no slurred speech   		Skin: Warm, well perfused, no rash  Psych: normal affect

## 2023-02-04 NOTE — ED ADULT NURSE REASSESSMENT NOTE - NS ED NURSE REASSESS COMMENT FT1
Received report from RONNI Burks. Pt is A&Ox3, breathing spontaneously, unlabored and speaking in full sentences on RA, on CM, NSR. Pt safety and comfort measures provided.

## 2023-02-04 NOTE — PHYSICAL THERAPY INITIAL EVALUATION ADULT - ADDITIONAL COMMENTS
Pt states that she lives in private home with children (ages 24 & 27), 10 steps to enter, one level within, PTA independent with mobility and ADL's, owns no DME, (+)working as [employer]

## 2023-02-04 NOTE — PHYSICAL THERAPY INITIAL EVALUATION ADULT - GAIT DEVIATIONS NOTED, PT EVAL
decreased bryson/increased time in double stance/decreased velocity of limb motion/decreased step length/decreased stride length/decreased weight-shifting ability

## 2023-02-04 NOTE — ED CDU PROVIDER INITIAL DAY NOTE - DETAILS
tele, neuro checks, MRI, neurology following, [facility] ED team, vitals every 4 hours, frequent reevaluations

## 2023-02-04 NOTE — ED CDU PROVIDER INITIAL DAY NOTE - NS ED ATTENDING STATEMENT MOD
This was a shared visit with the RUI. I reviewed and verified the documentation and independently performed the documented:

## 2023-02-04 NOTE — ED CDU PROVIDER INITIAL DAY NOTE - NS ED ROS FT
Constitutional: No fever or chills  Eyes: No visual changes, no eye pain   CV: No chest pain or lower extremity edema  Resp: No SOB no cough  GI: No abd pain. No nausea or vomiting. No diarrhea.   : No dysuria, hematuria.   MSK: No musculoskeletal pain  Skin: No rash  Psych: No complaints   Neuro: No headache. +numbness + weakness.  Endo: No known diabetes

## 2023-02-04 NOTE — ED CDU PROVIDER INITIAL DAY NOTE - PHYSICAL EXAMINATION
Gen: NAD, AOx3, able to make needs known, non-toxic  	Head: NCAT  	HEENT: EOMI, normal conjunctiva  	Lung: CTAB, no respiratory distress, no wheezes/rhonchi/rales B/L, speaking in full sentences  	CV: RRR, no M/R/G  	Abd: soft, NTND, no guarding  	MSK: no visible bony deformities  	Neuro: 5/5 strength LUE/LLE, 4/5 strength RUE/RLE, reports decreased sensation RUE/RLE compared to LUE/LLE, no slurred speech   	Skin: Warm, well perfused, no rash  Psych: normal affect Gen: NAD, AOx3, able to make needs known, non-toxic  	Head: NCAT, reported decreased sensation R face compared to L side   	HEENT: EOMI, normal conjunctiva  	Lung: CTAB, no respiratory distress, no wheezes/rhonchi/rales B/L, speaking in full sentences  	CV: RRR, no M/R/G  	Abd: soft, NTND, no guarding  	MSK: no visible bony deformities  	Neuro: 5/5 strength LUE/LLE, 4/5 strength RUE/RLE, reports decreased sensation RUE/RLE compared to LUE/LLE, no slurred speech   	Skin: Warm, well perfused, no rash  Psych: normal affect

## 2023-02-04 NOTE — ED CDU PROVIDER SUBSEQUENT DAY NOTE - PROGRESS NOTE DETAILS
Pt expresses that she is agreeable to HIV testing. - Fiona Merino PA-C CDU NOTE SADIE Torrez: pt resting comfortably, no numbness/tingling. does still report R side weakness UE and LE. NAD VSS. no events on tele. neuro exam- decreased sensation RUE, RLE compared to L side. 4/5 strength at RLE compared to LLE. b/l UEs 5/5 strength.   awaiting MRI. CDU NOTE SADIE Torrez: pt evaluated by PT- recommend outpatient PT, can use cane for support as needed. MRIs negative. reviewed with Neurology- ok to d/c home, no ASA/Plavix recommended. pt to f/up with neuro outpatient.  as per Dr. Lopez, pt stable for d/c home

## 2023-02-04 NOTE — ED CDU PROVIDER SUBSEQUENT DAY NOTE - CLINICAL SUMMARY MEDICAL DECISION MAKING FREE TEXT BOX
See observation monitoring plan section from initial day note.   Pending  MRI and neuro recommendations

## 2023-02-04 NOTE — ED CDU PROVIDER INITIAL DAY NOTE - ATTENDING APP SHARED VISIT CONTRIBUTION OF CARE
I, Tello Horton, performed a history and physical exam of the patient and discussed their management with the resident and/or advanced care provider. I reviewed the resident and/or advanced care provider's note and agree with the documented findings and plan of care. I was present and available for all procedures.     47-year-old woman with no PMH, presenting after leaving [facility] for further management of right sided weakness.  Patient states that she has chronic right leg weakness for the last month.  On Sunday started having acute worsening of right leg weakness and new right arm weakness with right lip numbness. Presented to [facility] on Monday, had a CT head and CTA which did not show any hemorrhage or large territorial infarcts, and received aspirin, Plavix, atorvastatin during her stay per the paperwork that was given to her by [facility].  Neurology team was considering ischemic stroke versus a demyelinating illness, however the MRI machine at [facility] was broken and patient thus decided to leave [place] to pursue MRI here. Patient states that since Sunday her weakness and numbness has improved.  Denies pain.    Well appearing and in NAD, head normal appearing atraumatic, trachea midline, no respiratory distress, lungs cta bilaterally, rrr no murmurs, soft NT ND abdomen, no visible extremity deformities, Alert and oriented, non focal neuro exam, skin warm and dry, normal affect and mood no facial asymmetry, no drift no diadochokinesia amb wo assistance     poss cva reported wnl cth ctah/n otherwise neuro intact reported weakness will eval with screening labs ekg neuro consultation for further guidance on disposition. discussed with patient agreed w plan unlikely acs pe ptx pna dissection no hx to suggest Demyelinating dz.    cdu for further curran as recc by neuro

## 2023-02-04 NOTE — ED CDU PROVIDER DISPOSITION NOTE - ATTENDING APP SHARED VISIT CONTRIBUTION OF CARE
The patient was serially evaluated throughout emergency department course by the team. There was no acute deterioration up to this time in the emergency department. The patient has demonstrated clinical improvement and/or stability, feels better at this time according to emergency department team. Agree with goals/plan of emergency department care as described in this physician's electronic medical record, including diagnostics, therapeutics and consultation recommendation as clinically warranted. Will discharge home with close outpatient follow up with primary care physician/provider and specialist if necessary. The patient and/or family was educated on expectant management and return precautions concerning signs and features to return to the emergency department, in layman terms, including but not limited to: nausea, vomiting, fever, chills, the inability to eat, take medications, or drink, persistent/worsening symptoms or any concerns at all. There are no acute or immediate life threatening issues present on history, clinical exam, or any diagnostic evaluation. The patient is a safe disposition home, has capacity and insight into their condition, is ambulatory in the Emergency Department with no further questions and will follow up with their doctor(s) this week. Diagnosis, prognosis, natural history and treatment was discussed with patient and/or family. The patient and/or family were given the opportunity to ask questions and have them answered in full. The patient and/or family are with capacity and insight into the situation, treatment, risks, benefits, alternative therapies, and understand that they can ask any further questions if needed. Patient and/or family/guardian understands anticipatory guidance and was given strict return and follow up precautions. The patient and/or family/guardian has been informed of the necessity to follow up with the PMD/Clinic/follow up as provided within 2-3 days, and the patient and/or family/guardian reports understanding of above with capacity and insight. The patient and/or family/guardian were informed of any results of their tests and are were encouraged to follow up on the findings with their doctor as well as the need to inform their doctor of any results. The patient and/or family/guardian are aware of the need to follow up with repeat testing as applicable and report understanding of the above with capacity and insight. The patient and/or family/guardian was made aware of any pending test results at the time of discharge and of the need to call back for the final results as well as the need to inform their doctor of the results.

## 2023-02-04 NOTE — ED CDU PROVIDER DISPOSITION NOTE - PATIENT PORTAL LINK FT
You can access the FollowMyHealth Patient Portal offered by [facility] by registering at the following website: [email or web address]. By joining [employer]’s FollowMyHealth portal, you will also be able to view your health information using other applications (apps) compatible with our system.

## 2023-02-04 NOTE — PHYSICAL THERAPY INITIAL EVALUATION ADULT - GENERAL OBSERVATIONS, REHAB EVAL
received semisupine in bed, A&Ox4, following commands, pleasant & willing to participate, a/w c/o right sided weakness & impaired sensation.

## 2023-02-04 NOTE — ED CDU PROVIDER SUBSEQUENT DAY NOTE - HISTORY
Patient now in CDU, no acute complaints. NAD. Plan for MRI in the setting of weakness/numbness with neurology following.   I had spoken with neurology resident regarding continuing Aspirin and Plavix that patient was given in [facility], per discharge note. She states to hold at this time until MRI done. Per patient, she was not sent prescriptions. - Fiona Merino PA-C

## 2023-02-04 NOTE — ED CDU PROVIDER DISPOSITION NOTE - NSFOLLOWUPINSTRUCTIONS_ED_ALL_ED_FT
Hydrate.     . We recommend you follow up with your primary care provider within the next 2-3 days, please bring all of your results with you.     You can also follow up with ...    Please return to the Emergency Department with new, worsening, or concerning symptoms, such as:  -Shortness of breath or trouble breathing  -Pressure, pain, tightness in chest  -Facial drooping, arm weakness, or speech difficulty   -Head injury or loss of consciousness   -Nonstop bleeding or an open wound     *More detailed information regarding your visit and discharge can be found by reviewing this packet 1. stay hydrated.  2. outpatient Physical Therapy, use cane for support as needed.   3. Follow up with your PCP in 1-2 days.  4. Follow up with Neurology  #[phone] in 2-3 days.  5. Bring Printed Results to your Doctor Visits. Stroke Education given to you.  6. Return if symptoms worsen, fever, new weakness, new numbness, dizziness, vision change, slurred speech and all other concerns    Please follow up the following with your doctors:  elevated ESR and CRP (inflammatory markers)  elevated A1C- prediabetic range, follow low carb/sugar diet  mildly elevated cholesterol  low vitamin D (can start vitamin D over the counter)  low potassium- increase potassium in your diet  mildly elevated CPK  review MRI results (mild abnormalities) with your doctors       Stroke Prevention    AMBULATORY CARE:    Stroke prevention includes making lifestyle changes and managing health conditions that can lead to a stroke. Your healthcare provider can help you create a plan that will be specific to your needs.    Know your risk for a stroke: You cannot control some risk factors. Examples are being older than 55 or , or having a family history of stroke. You can take action for any of the following risk factors:   •A personal history of transient ischemic attack (TIA)      •Diabetes or high cholesterol      •Atrial fibrillation, high blood pressure, or heart disease      •Smoking cigarettes, drinking alcohol, or using drugs such as cocaine      •Obesity or not enough physical activity      •Taking birth control pills, especially in women older than 35 who smoke cigarettes      Medicines to help prevent a stroke depend on your stroke risk factors. Your healthcare provider may recommend any of the following:  •Blood thinners help prevent blood clots. Clots can cause strokes, heart attacks, and death. The following are general safety guidelines to follow while you are taking a blood thinner:?Watch for bleeding and bruising while you take blood thinners. Watch for bleeding from your gums or nose. Watch for blood in your urine and bowel movements. Use a soft washcloth on your skin, and a soft toothbrush to brush your teeth. This can keep your skin and gums from bleeding. If you shave, use an electric shaver. Do not play contact sports.       ?Tell your dentist and other healthcare providers that you take a blood thinner. Wear a bracelet or necklace that says you take this medicine.       ?Do not start or stop any other medicines unless your healthcare provider tells you to. Many medicines cannot be used with blood thinners.      ?Take your blood thinner exactly as prescribed by your healthcare provider. Do not skip does or take less than prescribed. Tell your provider right away if you forget to take your blood thinner, or if you take too much.      ?Warfarin is a blood thinner that you may need to take. The following are things you should be aware of if you take warfarin: ?Foods and medicines can affect the amount of warfarin in your blood. Do not make major changes to your diet while you take warfarin. Warfarin works best when you eat about the same amount of vitamin K every day. Vitamin K is found in green leafy vegetables and certain other foods. Ask for more information about what to eat when you are taking warfarin.      ?You will need to see your healthcare provider for follow-up visits when you are on warfarin. You will need regular blood tests. These tests are used to decide how much medicine you need.         •Blood pressure (BP) medicines may be given to help control hypertension. Antihypertensives can help lower your BP and keep it within your recommended range.      •Diuretics help decrease extra fluid that collects in your body. This helps lower your BP.       •Medicine may also help lower your cholesterol level. A low cholesterol level helps prevent heart disease and makes it easier to control your blood pressure.      •Low-dose aspirin may be recommended to prevent blood clots. Your healthcare provider will tell you if you should take aspirin, and how much to take each day.      Lifestyle changes that can help prevent a stroke:   Prevent Heart Disease        •Stay active. Aim to get physical activity for 30 minutes a day, on most days of the week. You can break activity into 10 minute periods, 3 times during the day. Activity can lower your risk for problems that can lead to a stroke. Activity can control you blood pressure, cholesterol, weight, and blood sugar levels. Find an exercise that you enjoy. This will make it easier for you to reach your exercise goals.  Ways to Be Physically Active       Strength Training for Adults           •Limit sodium (salt) as directed. Too much sodium can affect your fluid balance. Check labels to find low-sodium or no-salt-added foods. Some low-sodium foods use potassium salts for flavor. Too much potassium can also cause health problems. Your healthcare provider will tell you how much sodium and potassium are safe for you to have in a day. He or she may recommend that you limit sodium to 2,300 mg a day.             •Follow the meal plan recommended by your healthcare provider. A dietitian or your provider can give you more information on low-sodium plans or the DASH (Dietary Approaches to Stop Hypertension) eating plan. The DASH plan is low in sodium, processed sugar, unhealthy fats, and total fat. It is high in potassium, calcium, and fiber. These can be found in vegetables, fruit, and whole-grain foods.             •Maintain a healthy weight. Being overweight or obese can increase your risk for a stroke. Ask your healthcare provider what a healthy weight is for you. Ask him or her to help you create a weight loss plan if you are overweight. He or she can help you create small goals if you have a lot of weight to lose.  Weight Checks LENORE           •Talk to your healthcare provider about alcohol. Alcohol can raise your blood pressure. The recommended limit is 2 drinks in a day for men and 1 drink in a day for women. Do not binge drink or save a week's worth of alcohol to drink in 1 or 2 days. Limit weekly amounts as directed by your provider.      •Do not smoke. Nicotine and other chemicals in cigarettes and cigars can cause lung and heart damage. Heart and lung damage can increase your risk for a stroke. Ask your healthcare provider for information if you currently smoke and need help to quit. E-cigarettes or smokeless tobacco still contain nicotine. Talk to your healthcare provider before you use these products.      •Do not use illegal drugs. Cocaine and other illegal drugs can cause a stroke. Talk to your healthcare provider if you currently use illegal drugs and need help to quit.      •Manage stress. Stress can raise your blood pressure. Find ways to relax, such as deep breathing or listening to music.      Manage health conditions that can lead to a stroke:   •Manage your blood pressure (BP): ?Get regular BP screening. Screening can help find high BP early to lower your risk for a stroke. Your healthcare provider will give you directions to lower and control your BP.  Blood Pressure Readings           ?Check your BP as directed. You can monitor your blood pressure at home. Ask your healthcare provider how often to check your blood pressure and what your blood pressure should be. Tell your healthcare provider if your blood pressure is higher than what he or she says it should be. He or she may need to change your medicine or help you make changes to your nutrition or exercise plan.   How to take a Blood Pressure           •Manage diabetes. Good control of your blood sugar levels may decrease your risk for a stroke. If you take diabetes medicine or insulin, take it as directed. Healthy foods and regular exercise also help lower your blood sugar levels. Monitor your levels as directed. Keep a record of your blood sugar levels and bring them to your appointments. This will help your healthcare provider make changes to your medicines. It may also help you find ways to get better control of your diabetes.  How to check your blood sugar           •Manage sleep apnea. Sleep apnea can cause stroke risk factors, such as high blood pressure, heart failure, or heart rhythm problems. Get screened and treated for sleep apnea. Talk to your healthcare provider about devices that help prevent complications from sleep apnea. You may need to use a CPAP or BiPAP machine while you sleep. These machines increase your oxygen levels and keep your airway open.  CPAP           •Manage other medical conditions that increase your risk for a stroke. Atrial fibrillation (a-fib) is an abnormal heart rhythm that can cause blood clots. Medicines or procedures may be used to control a-fib. Patent foramen ovale (PFO) can also lead to a stroke. Your healthcare provider may recommend you have surgery to close a PFO, if needed. Sickle cell disease, or sickle cell anemia, can cause blockages in blood vessels. The blockages may need to be removed during surgery. Depression and anxiety can stress your heart. Stress may lead to high blood pressure or heart disease. Talk to your healthcare provider about treatment for depression or anxiety.      •Talk to your healthcare provider about risk factors for women. Birth control pills increase your risk, especially if you are older than 35 or smoke cigarettes. Talk to your healthcare provider about other forms of contraception. Estrogen levels drop during menopause. Low estrogen levels may increase your risk for stroke. Talk to your healthcare provider about hormone replacement therapy to reduce your risk for a stroke.      Follow up with your doctor or neurologist as directed: You may need a CT or MRI of your brain to check for problems that may cause a stroke. Write down your questions so you remember to ask them during your visits.       © Copyright [employer] 2022           back to top                          © Copyright [employer] 2022

## 2023-02-04 NOTE — PHYSICAL THERAPY INITIAL EVALUATION ADULT - PLANNED THERAPY INTERVENTIONS, PT EVAL
GOALS: Pt will negotiate 12 steps with handrail & step to pattern with independence in 4wks/gait training/strengthening

## 2023-02-04 NOTE — PHYSICAL THERAPY INITIAL EVALUATION ADULT - PERTINENT HX OF CURRENT PROBLEM, REHAB EVAL
Pt is 47F admitted 2/3/23 PMHx presenting with a 1-2 week history of RLE weakness and a history of RUE weakness and numbness in the bottom lip since 1/29. Pt reports no preceding events prior to the RLE weakness; it just appeared one day. She describes having difficulty walking and having to hop on the L side as well as balance issues on the right. She reports feeling more clumsy than usual. On 1/29, the weakness in the arm and the numbness in the bottom lip occurred simultaneously, and she had a headache across her forehead. On 1/30, she reports taking a warm shower and having profound weakness on her entire R side of her body and difficulty standing and had to get out of the shower to avoid falling. Today she reports weakness in the RUE and RLE and numbness in the bottom lip, mostly on the R side. She mentions the numbness in the bottom lip is improving. Pt denies any numbness, tingling, burning sensation, or pain in the UE/LEs b/l. Neurology was consulted to evaluate her symptoms and determine if she needs to go to CDU.     Of note, pt reports having a cold about 2 months ago that wouldn't go away. She also reports a 2-3 month history of nausea, lightheadedness, dizziness, and a slight spinning sensation when going up and down the stairs. This feeling subsides after sitting and they occur with warning. She denies any nausea, lightheadedness or dizziness occurring with changing positions. She also reports a long standing history of on and off lower back pain.

## 2023-02-04 NOTE — ED CDU PROVIDER DISPOSITION NOTE - CLINICAL COURSE
47-year-old woman with no PMH, presenting after leaving [facility] AM for further management of right sided weakness.  Patient states that she has had right leg weakness for the last month.  On Sunday started having acute worsening of right leg weakness and new right arm weakness with right lip numbness. Presented to [facility] on Monday, had a CT head and CTA which did not show any hemorrhage or large territorial infarcts, and received aspirin, Plavix, atorvastatin during her stay per the paperwork that was given to her by [facility].  Plan was for MRI but MRI machine at [facility] was broken and patient thus decided to leave [place] to pursue MRI here. Patient states that since Sunday her weakness and numbness has improved.  Denies pain.  ED course: Evaluated by neurology, plan for additional blood work and MRIs in CDU. 47-year-old woman with no PMH, presenting after leaving [facility] AM for further management of right sided weakness.  Patient states that she has had right leg weakness for the last month.  On Sunday started having acute worsening of right leg weakness and new right arm weakness with right lip numbness. Presented to [facility] on Monday, had a CT head and CTA which did not show any hemorrhage or large territorial infarcts, and received aspirin, Plavix, atorvastatin during her stay per the paperwork that was given to her by [facility].  Plan was for MRI but MRI machine at [facility] was broken and patient thus decided to leave [place] to pursue MRI here. Patient states that since Sunday her weakness and numbness has improved.  Denies pain.  ED course: Evaluated by neurology, plan for additional blood work and MRIs in CDU.  in cdu, MRIs negative, reviewed with neuro- ok to dc home, no role for asa/plavix per our conversation. no events on tele. PT evaluated- recommend outpatient PT, use of cane as needed

## 2023-02-04 NOTE — ED CDU PROVIDER INITIAL DAY NOTE - OBJECTIVE STATEMENT
47-year-old woman with no PMH, presenting after leaving [facility] AM for further management of right sided weakness.  Patient states that she has had right leg weakness for the last month.  On Sunday started having acute worsening of right leg weakness and new right arm weakness with right lip numbness. Presented to [email or web address] on Monday, had a CT head and CTA which did not show any hemorrhage or large territorial infarcts, and received aspirin, Plavix, atorvastatin during her stay per the paperwork that was given to her by [facility].  Plan was for MRI but MRI machine at [facility] was broken and patient thus decided to leave [place] to pursue MRI here. Patient states that since Sunday her weakness and numbness has improved.  Denies pain.  ED course: Evaluated by neurology, plan for additional blood work and MRIs in CDU.

## 2023-02-07 LAB
PYRIDOXAL PHOS SERPL-MCNC: 4.5 UG/L — SIGNIFICANT CHANGE UP (ref 3.4–65.2)
WNV IGG TITR FLD: POSITIVE
WNV IGM SPEC QL: NEGATIVE — SIGNIFICANT CHANGE UP

## 2023-02-07 NOTE — ED POST DISCHARGE NOTE - RESULT SUMMARY
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 pt was in ED/CDU for right sided weakness

## 2023-02-07 NOTE — ED POST DISCHARGE NOTE - DETAILS
2/7/23: Spoke with pt briefly who reports residual right sided weakness since discharge but denies new or worsening symptoms. Has neuro followup arranged. 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 pt info given to referral rep to expedite neurology and ID followup - Julián Diaz PA-C 2/7/23: Spoke with neuro resident - IgG+ without IgM no urgent intervention needed, can followup outpatient. Spoke with pt briefly who reports residual right sided weakness since discharge but denies new or worsening symptoms. Has neuro followup arranged. 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 pt info given to referral rep to expedite neurology and ID followup - Julián Diza PA-C

## 2023-02-08 LAB
B BURGDOR DNA SPEC QL NAA+PROBE: NEGATIVE — SIGNIFICANT CHANGE UP

## 2023-02-11 LAB
AQP4 H2O CHANNEL AB SERPL IA-ACNC: NEGATIVE — SIGNIFICANT CHANGE UP
MOG AB SER QL CBA IFA: NEGATIVE — SIGNIFICANT CHANGE UP

## 2023-03-17 PROBLEM — Z00.00 ENCOUNTER FOR PREVENTIVE HEALTH EXAMINATION: Status: ACTIVE | Noted: 2023-03-17

## 2023-04-03 NOTE — ED CLERICAL - BED REQUESTED
Physical Therapy Visit    Visit Type: Daily Treatment Note  Visit: 3  Referring Provider: Basia Torres PA-C  Medical Diagnosis (from order): Diagnosis Information    Diagnosis  724.4 (ICD-9-CM) - M54.16 (ICD-10-CM) - Lumbar radiculopathy         SUBJECTIVE                                                                                                               Patient states she was feeling pretty sore after last visit. Patient states her numbness/tingling has improved but her symptoms continue to persist in her lower back, left gluteal region, and posterior calf. Patient states massage improves symptoms. Patient states some of the exercises seem to be helping but her symptoms continue to be present.     Pain / Symptoms  - Pain rating (out of 10): Current: 4 ; Worst: 8      OBJECTIVE                                                                                                                                       Treatment     Therapeutic Exercise  Nu-step x6 minutes for range of motion, activity tolerance, and lower extremity strength.   Seated sciatic nerve glide: 3x10  Standing mini squats: 3x10  Prone press up: 3x10  Lateral 4in step up: 3x10  Supine marching with transverse abdominis activation: 3x10       Therapeutic Activity  Patient education provided on HEP instruction. Patient is able to demonstrate proper form with all HEP activities.       Skilled input: verbal instruction/cues, posture correction and tactile instruction/cues    Writer verbally educated and received verbal consent for hand placement, positioning of patient, and techniques to be performed today from patient for clothing adjustments for techniques, therapist position for techniques and hand placement and palpation for techniques as described above and how they are pertinent to the patient's plan of care.    Home Exercise Program  Access Code: [email or web address]  URL: [email or web address]  Date: 03/28/2023  Prepared  by: Lukasz Lim    Exercises  - Supine Lower Trunk Rotation  - 1 x daily - 7 x weekly - 3 sets - 10 reps  - Seated Abdominal Press into Swiss Ball  - 1 x daily - 7 x weekly - 3 sets - 10 reps - 1-2 hold  - Seated Piriformis Stretch with Trunk Bend  - 1 x daily - 7 x weekly - 3 sets - 30 hold  - Hip Abduction with Resistance Loop  - 1 x daily - 7 x weekly - 3 sets - 10 reps  - 90/90 SI Joint Self-Correction with Dowel  - 1 x daily - 7 x weekly - 3 sets - 5 reps - 5 hold        ASSESSMENT                                                                                                            Patient presents to therapy with improving numbness/tingling of lower extremities along with centralization of symptoms. Patient continues to have centralized lumbar spine pain and occasional posterior left hip pain. Patient tolerates lumbar extension based activities well today. Continue therapy to improve strength, mobility, activity tolerance, and gait.   Pain/symptoms after session (out of 10): 4  Education:   - Results of above outlined education: Verbalizes understanding and Demonstrates understanding    PLAN                                                                                                                           Suggestions for next session as indicated: Progress per plan of care. Muscle energy technique, nerve gliding, extension based activities.       Therapy procedure time and total treatment time can be found documented on the Time Entry flowsheet     03-Feb-2023 15:40

## 2024-12-12 NOTE — ED ADULT NURSE NOTE - NS ED STAT RN HAND OFF 2
BEHAVIORAL TEAM DISCUSSION    Participants:   Sesar Fajardo NP, May Booth LSW,  Soraya Fraire LSW, Margo Johnston SW, Adelaide Nicholson [facility], Nieves Nicole RN, Angelita Lugo RN, Chanda Sunshine OT, Yudelka De Los Santos OT, Andie Austin no Additional handoff